# Patient Record
Sex: MALE | Race: WHITE | Employment: FULL TIME | URBAN - METROPOLITAN AREA
[De-identification: names, ages, dates, MRNs, and addresses within clinical notes are randomized per-mention and may not be internally consistent; named-entity substitution may affect disease eponyms.]

---

## 2019-06-01 ENCOUNTER — HOSPITAL ENCOUNTER (EMERGENCY)
Age: 29
Discharge: HOME | End: 2019-06-01
Payer: OTHER GOVERNMENT

## 2019-06-01 VITALS
OXYGEN SATURATION: 97 % | TEMPERATURE: 97.3 F | SYSTOLIC BLOOD PRESSURE: 127 MMHG | BODY MASS INDEX: 15.9 KG/M2 | DIASTOLIC BLOOD PRESSURE: 73 MMHG | HEIGHT: 73 IN | HEART RATE: 75 BPM | RESPIRATION RATE: 12 BRPM | WEIGHT: 120 LBS

## 2019-06-01 DIAGNOSIS — H57.89 IRRITATION OF RIGHT EYE: ICD-10-CM

## 2019-06-01 DIAGNOSIS — H10.9 CONJUNCTIVITIS OF RIGHT EYE, UNSPECIFIED CONJUNCTIVITIS TYPE: Primary | ICD-10-CM

## 2019-06-01 PROCEDURE — 99283 EMERGENCY DEPT VISIT LOW MDM: CPT

## 2019-06-01 PROCEDURE — 6370000000 HC RX 637 (ALT 250 FOR IP): Performed by: PHYSICIAN ASSISTANT

## 2019-06-01 RX ORDER — SULFACETAMIDE SODIUM 100 MG/ML
1 SOLUTION/ DROPS OPHTHALMIC ONCE
Status: COMPLETED | OUTPATIENT
Start: 2019-06-01 | End: 2019-06-01

## 2019-06-01 RX ORDER — SULFACETAMIDE SODIUM 100 MG/ML
SOLUTION/ DROPS OPHTHALMIC
Qty: 1 BOTTLE | Refills: 0 | Status: SHIPPED | OUTPATIENT
Start: 2019-06-01

## 2019-06-01 RX ADMIN — SULFACETAMIDE SODIUM 1 DROP: 100 SOLUTION/ DROPS OPHTHALMIC at 19:46

## 2019-06-01 RX ADMIN — FLUORESCEIN SODIUM 1 MG: 1 STRIP OPHTHALMIC at 19:48

## 2019-06-01 ASSESSMENT — VISUAL ACUITY
OU: 20/15
OD: 20/15
OS: 20/15

## 2019-06-01 ASSESSMENT — PAIN DESCRIPTION - ORIENTATION: ORIENTATION: RIGHT

## 2019-06-01 ASSESSMENT — PAIN SCALES - GENERAL: PAINLEVEL_OUTOF10: 6

## 2019-06-01 ASSESSMENT — PAIN DESCRIPTION - PAIN TYPE: TYPE: ACUTE PAIN

## 2019-06-01 ASSESSMENT — PAIN DESCRIPTION - LOCATION: LOCATION: EYE

## 2019-06-01 NOTE — ED PROVIDER NOTES
catarina.    Patient was given:  Medications   fluorescein ophthalmic strip 1 mg (1 mg Both Eyes Given by Other 6/1/19 1948)   sulfacetamide (BLEPH-10) 10 % ophthalmic solution 1 drop (1 drop Right Eye Given 6/1/19 1946)       Patient presenting with irritation and drainage right eye. Began on Wednesday or by 3 days ago. Seen in urgent care on Thursday or 48 hours ago. They did prescribe antibiotic ophthalmic ointment. Despite the use of this product the symptoms have progressed as of this morning had crusting upon awakening. Careful examination of the eye reveals no obvious foreign body. Purulence noted lower second medial [place] region. I recommended discontinuation of the eye ointment. I did prescribe sulfacetamide of stomach drops. He is instructed in use of the products. Recommending ibuprofen/Tylenol for pain control. Recommended cool compresses. He is to contact eye physician on Monday be seen Monday. The patient does express understanding of his diagnosis and treatment plan. The patient tolerated their visit well. I evaluated the patient. The physician was available for consultation as needed. The patient and / or the family were informed of the results of anytests, a time was given to answer questions, a plan was proposed and they agreed with plan. CLINICAL IMPRESSION:  1. Conjunctivitis of right eye, unspecified conjunctivitis type    2.  Irritation of right eye        DISPOSITION Decision To Discharge 06/01/2019 07:11:19 PM      PATIENT REFERRED TO:  Yen Bear, PhD  [address] [facility]  [phone]    Schedule an appointment as soon as possible for a visit in 2 days      [facility] ED  [address] [address]  [phone]  Go to   If symptoms worsen      DISCHARGE MEDICATIONS:  Discharge Medication List as of 6/1/2019  7:14 PM      START taking these medications    Details   sulfacetamide (BLEPH-10) 10 % ophthalmic

## 2019-06-01 NOTE — ED NOTES
Discharge instructions reviewed with Mr. Jada Christiansen. He verbalized understanding. Copy of discharge instructions and prescriptions given. Mr. Jada Christiansen was discharged to home in good condition per personal vehicle. He exited the ED without difficulty.         Jamari Lara RN  06/01/19 [id number]